# Patient Record
Sex: FEMALE | Race: BLACK OR AFRICAN AMERICAN | URBAN - METROPOLITAN AREA
[De-identification: names, ages, dates, MRNs, and addresses within clinical notes are randomized per-mention and may not be internally consistent; named-entity substitution may affect disease eponyms.]

---

## 2017-01-18 ENCOUNTER — HISTORICAL (OUTPATIENT)
Dept: RADIOLOGY | Facility: HOSPITAL | Age: 37
End: 2017-01-18

## 2017-01-18 ENCOUNTER — HISTORICAL (OUTPATIENT)
Dept: ADMINISTRATIVE | Facility: HOSPITAL | Age: 37
End: 2017-01-18

## 2017-03-28 ENCOUNTER — HISTORICAL (OUTPATIENT)
Dept: LAB | Facility: HOSPITAL | Age: 37
End: 2017-03-28

## 2017-04-03 ENCOUNTER — HISTORICAL (OUTPATIENT)
Dept: RADIOLOGY | Facility: HOSPITAL | Age: 37
End: 2017-04-03

## 2017-05-10 ENCOUNTER — HISTORICAL (OUTPATIENT)
Dept: ADMINISTRATIVE | Facility: HOSPITAL | Age: 37
End: 2017-05-10

## 2017-06-07 ENCOUNTER — HISTORICAL (OUTPATIENT)
Dept: RADIOLOGY | Facility: HOSPITAL | Age: 37
End: 2017-06-07

## 2017-07-10 ENCOUNTER — HISTORICAL (OUTPATIENT)
Dept: RADIOLOGY | Facility: HOSPITAL | Age: 37
End: 2017-07-10

## 2017-08-29 ENCOUNTER — HISTORICAL (OUTPATIENT)
Dept: ADMINISTRATIVE | Facility: HOSPITAL | Age: 37
End: 2017-08-29

## 2018-01-30 ENCOUNTER — HISTORICAL (OUTPATIENT)
Dept: LAB | Facility: HOSPITAL | Age: 38
End: 2018-01-30

## 2018-01-30 LAB
ABS NEUT (OLG): 2.24 X10(3)/MCL (ref 2.1–9.2)
BUN SERPL-MCNC: 9 MG/DL (ref 7–18)
CALCIUM SERPL-MCNC: 9.1 MG/DL (ref 8.5–10.1)
CHLORIDE SERPL-SCNC: 105 MMOL/L (ref 98–107)
CO2 SERPL-SCNC: 28 MMOL/L (ref 21–32)
CREAT SERPL-MCNC: 0.71 MG/DL (ref 0.55–1.02)
ERYTHROCYTE [DISTWIDTH] IN BLOOD BY AUTOMATED COUNT: 12.9 % (ref 11.5–17)
GLUCOSE SERPL-MCNC: 94 MG/DL (ref 74–106)
HCT VFR BLD AUTO: 40.1 % (ref 37–47)
HGB BLD-MCNC: 12.7 GM/DL (ref 12–16)
MCH RBC QN AUTO: 25.9 PG (ref 27–31)
MCHC RBC AUTO-ENTMCNC: 31.7 GM/DL (ref 33–36)
MCV RBC AUTO: 81.7 FL (ref 80–94)
PLATELET # BLD AUTO: 312 X10(3)/MCL (ref 130–400)
PMV BLD AUTO: 9.9 FL (ref 7.4–10.4)
POTASSIUM SERPL-SCNC: 3.9 MMOL/L (ref 3.5–5.1)
RBC # BLD AUTO: 4.91 X10(6)/MCL (ref 4.2–5.4)
SODIUM SERPL-SCNC: 140 MMOL/L (ref 136–145)
WBC # SPEC AUTO: 4.9 X10(3)/MCL (ref 4.5–11.5)

## 2018-02-05 ENCOUNTER — HISTORICAL (OUTPATIENT)
Dept: SURGERY | Facility: HOSPITAL | Age: 38
End: 2018-02-05

## 2018-02-05 LAB — POC BETA-HCG (QUAL): NEGATIVE

## 2019-01-18 ENCOUNTER — HISTORICAL (OUTPATIENT)
Dept: ADMINISTRATIVE | Facility: HOSPITAL | Age: 39
End: 2019-01-18

## 2022-04-07 ENCOUNTER — HISTORICAL (OUTPATIENT)
Dept: ADMINISTRATIVE | Facility: HOSPITAL | Age: 42
End: 2022-04-07

## 2022-04-24 VITALS
HEIGHT: 59 IN | WEIGHT: 132.25 LBS | DIASTOLIC BLOOD PRESSURE: 66 MMHG | SYSTOLIC BLOOD PRESSURE: 120 MMHG | BODY MASS INDEX: 26.66 KG/M2

## 2022-04-28 NOTE — OP NOTE
Patient:   Abena Amos             MRN: [id number]            FIN: [id number]               Age:   37 years     Sex:  Female     :  1980   Associated Diagnoses:   None   Author:   Jason Ferguson MD      OPERATIVE REPORT    DATE: 2018    SURGEON: Jason Ferguson MD    ASSISTANT: Itzel Zapata    PREOPERATIVE DIAGNOSIS:  1.  Left medial femoral condyle subchondral fracture    POSTOPERATIVE DIAGNOSIS:   1.  Medial femoral condyle subchondral fracture  2.  Medial femoral chondromalacia, grade 23  3.  Anterior/intercondylar notch compartment adhesions    PROCEDURE:  1.  Left knee diagnostic arthroscopy  2.  Medial femoral chondroplasty  3.  Arthroscopic lysis of adhesions  4.  Arthroscopically assisted treatment of medial femoral condyle subchondral fracture (Bioplasty)    TYPE OF ANESTHESIA:    General anesthesia    BLOOD LOSS:  Less than 20 cc    DVT PROPHYLAXIS:  This patient placed on aspirin for 2 weeks postoperatively.    INSTRUMENTATION:  Arthrex bio plasty    HISTORY AND INDICATIONS:  Refer to the last orthopedic office visit note    PROCEDURE IN DETAIL:      Diagnostic knee arthroscopy  The patient was carefully place in a supine position. The operative lower extremity was placed in the padded leg pena. The non-operative lower extremity was placed in padded leg rest. The operative site was prepped and draped in the normal sterile fashion. A time out was performed to confirm correct operative extremity, allergies, and antibiotic infusion.   The knee joint was infused with 60cc of isotonic fluid. The supero-medial inflow portal is established. The lynn-medial and lynn-lateral portals are established. All portal sites established with 11-blade.  Through the lynn-lateral portal, we then sequentially visualized these areas of the knee for any pathology: medial comparment, postero-medial compartment, lateral compartment, postero-lateral compartment, intercondylar compartment, suprapatellar  compartment, medial gutter, and lateral gutter.   The certified assistant provided critical assistance by holding instruments including the arthroscope to provide adequate visualization of the procedure, as well as assisting in wound closure.  At the end of the procedure the knee was injected with 60cc of 0.50% Marcaine. The portals were closed with mastisol around the wound and placement of steri-strips. The patient tolerated the procedure well and taken to the recovery room in good condition.      Medial femoral condyle chondroplasty  Type 3 chondromalcia was noted on the Medial femoral condyle. A motorized shaver was introduced through the lynn-medial portal. The area of chondromalacia was debrided of all loose, soft, frayed and fibrillated cartilage material. A well contoured, smooth base of firm cartilage was produced.        Lysis of Adhesions of knee with or without manipulation  During the diagnostic arthroscopy adhesions were encountered. The adhesions were encountered in the suprapatellar pouch, lateral gutter, medial gutter, and the notch. The adhesions were removed with shaver and bitter.     Arthroscopically aided treatment of medial femoral condyle subchondral fracture (sub-chondroplasty)    I used fluoroscopy to guide my trocar into the appropriate area of the subchondral fracture.  I then injected the calcium phosphate paste into the subchondral fracture.  The trocar was kept in place for a total of 8 minutes to make sure there is no extravasation of the demineralized bone matrix.  The trocar was then removed and the knee arthroscopy procedure was performed.